# Patient Record
Sex: MALE | Race: WHITE | NOT HISPANIC OR LATINO | URBAN - METROPOLITAN AREA
[De-identification: names, ages, dates, MRNs, and addresses within clinical notes are randomized per-mention and may not be internally consistent; named-entity substitution may affect disease eponyms.]

---

## 2020-07-25 ENCOUNTER — TELEPHONE ENCOUNTER (OUTPATIENT)
Dept: URBAN - METROPOLITAN AREA CLINIC 13 | Facility: CLINIC | Age: 60
End: 2020-07-25

## 2020-07-25 RX ORDER — ESOMEPRAZOLE MAGNESIUM 40 MG/1
TAKE ONE CAPSULE BY MOUTH TWICE A DAY (FOR ACID REFLUX) CAPSULE, DELAYED RELEASE PELLETS ORAL
Qty: 60 | Refills: 5 | OUTPATIENT
Start: 2016-12-22 | End: 2018-12-26

## 2020-07-25 RX ORDER — DEXLANSOPRAZOLE 60 MG/1
TAKE 1 CAPSULE BY MOUTH DAILY CAPSULE, DELAYED RELEASE ORAL
Qty: 30 | Refills: 4 | OUTPATIENT
Start: 2014-01-02 | End: 2014-01-09

## 2020-07-25 RX ORDER — MELOXICAM 15 MG/1
TAKE 1 TABLET DAILY TABLET ORAL
Refills: 0 | OUTPATIENT
Start: 2013-12-02 | End: 2014-05-21

## 2020-07-26 ENCOUNTER — TELEPHONE ENCOUNTER (OUTPATIENT)
Dept: URBAN - METROPOLITAN AREA CLINIC 13 | Facility: CLINIC | Age: 60
End: 2020-07-26

## 2020-07-26 RX ORDER — DOCUSATE SODIUM 100 MG/1
TAKE 1 CAPSULE TWICE DAILY AS NEEDED CAPSULE, LIQUID FILLED ORAL
Refills: 0 | Status: ACTIVE | COMMUNITY
Start: 2019-06-12

## 2020-07-26 RX ORDER — LEVOFLOXACIN 500 MG/1
TABLET, FILM COATED ORAL
Qty: 7 | Refills: 0 | Status: ACTIVE | COMMUNITY
Start: 2018-01-10

## 2020-07-26 RX ORDER — MELOXICAM 15 MG/1
TABLET ORAL
Qty: 30 | Refills: 0 | Status: ACTIVE | COMMUNITY
Start: 2013-09-09

## 2020-07-26 RX ORDER — CITALOPRAM 20 MG/1
TAKE 1 TABLET DAILY TABLET ORAL
Qty: 30 | Refills: 5 | Status: ACTIVE | COMMUNITY
Start: 2019-09-19

## 2020-07-26 RX ORDER — DULOXETINE 20 MG/1
TAKE 1 CAPSULE DAILY CAPSULE, DELAYED RELEASE PELLETS ORAL
Qty: 30 | Refills: 5 | Status: ACTIVE | COMMUNITY
Start: 2019-09-19

## 2020-07-26 RX ORDER — BROMPHENIRAMINE MALEATE, PSEUDOEPHEDRINE HYDROCHLORIDE, 2; 30; 10 MG/5ML; MG/5ML; MG/5ML
SYRUP ORAL
Qty: 300 | Refills: 0 | Status: ACTIVE | COMMUNITY
Start: 2018-01-10

## 2020-07-26 RX ORDER — DOXYCYCLINE 100 MG/1
CAPSULE ORAL
Qty: 2 | Refills: 0 | Status: ACTIVE | COMMUNITY
Start: 2013-02-28

## 2020-08-07 ENCOUNTER — TELEPHONE ENCOUNTER (OUTPATIENT)
Dept: URBAN - METROPOLITAN AREA CLINIC 113 | Facility: CLINIC | Age: 60
End: 2020-08-07

## 2020-08-19 ENCOUNTER — TELEPHONE ENCOUNTER (OUTPATIENT)
Dept: URBAN - METROPOLITAN AREA CLINIC 113 | Facility: CLINIC | Age: 60
End: 2020-08-19

## 2020-08-19 ENCOUNTER — OFFICE VISIT (OUTPATIENT)
Dept: URBAN - METROPOLITAN AREA CLINIC 113 | Facility: CLINIC | Age: 60
End: 2020-08-19
Payer: COMMERCIAL

## 2020-08-19 VITALS
TEMPERATURE: 98.4 F | SYSTOLIC BLOOD PRESSURE: 135 MMHG | HEIGHT: 74 IN | DIASTOLIC BLOOD PRESSURE: 86 MMHG | RESPIRATION RATE: 20 BRPM | BODY MASS INDEX: 20.53 KG/M2 | HEART RATE: 64 BPM | WEIGHT: 160 LBS

## 2020-08-19 DIAGNOSIS — Z86.010 HISTORY OF ADENOMATOUS POLYP OF COLON: ICD-10-CM

## 2020-08-19 DIAGNOSIS — K21.0 GASTROESOPHAGEAL REFLUX DISEASE WITH ESOPHAGITIS: ICD-10-CM

## 2020-08-19 DIAGNOSIS — K22.2 ESOPHAGEAL STRICTURE: ICD-10-CM

## 2020-08-19 PROBLEM — 429047008: Status: ACTIVE | Noted: 2020-08-19

## 2020-08-19 PROCEDURE — G9903 PT SCRN TBCO ID AS NON USER: HCPCS | Performed by: INTERNAL MEDICINE

## 2020-08-19 PROCEDURE — 99214 OFFICE O/P EST MOD 30 MIN: CPT | Performed by: INTERNAL MEDICINE

## 2020-08-19 PROCEDURE — 1036F TOBACCO NON-USER: CPT | Performed by: INTERNAL MEDICINE

## 2020-08-19 PROCEDURE — G8427 DOCREV CUR MEDS BY ELIG CLIN: HCPCS | Performed by: INTERNAL MEDICINE

## 2020-08-19 RX ORDER — BROMPHENIRAMINE MALEATE, PSEUDOEPHEDRINE HYDROCHLORIDE, 2; 30; 10 MG/5ML; MG/5ML; MG/5ML
SYRUP ORAL
Qty: 300 | Refills: 0 | Status: DISCONTINUED | COMMUNITY
Start: 2018-01-10

## 2020-08-19 RX ORDER — MELOXICAM 15 MG/1
TABLET ORAL
Qty: 30 | Refills: 0 | Status: DISCONTINUED | COMMUNITY
Start: 2013-09-09

## 2020-08-19 RX ORDER — DOXYCYCLINE 100 MG/1
CAPSULE ORAL
Qty: 2 | Refills: 0 | Status: ON HOLD | COMMUNITY
Start: 2013-02-28

## 2020-08-19 RX ORDER — SODIUM, POTASSIUM,MAG SULFATES 17.5-3.13G
354 ML SOLUTION, RECONSTITUTED, ORAL ORAL ONCE
Qty: 354 MILLILITER | Refills: 0 | OUTPATIENT

## 2020-08-19 RX ORDER — DOCUSATE SODIUM 100 MG/1
TAKE 1 CAPSULE TWICE DAILY AS NEEDED CAPSULE, LIQUID FILLED ORAL
Refills: 0 | Status: ACTIVE | COMMUNITY
Start: 2019-06-12

## 2020-08-19 RX ORDER — LEVOFLOXACIN 500 MG/1
TABLET, FILM COATED ORAL
Qty: 7 | Refills: 0 | Status: DISCONTINUED | COMMUNITY
Start: 2018-01-10

## 2020-08-19 RX ORDER — CITALOPRAM 20 MG/1
TAKE 1 TABLET DAILY TABLET ORAL
Qty: 30 | Refills: 5 | Status: ON HOLD | COMMUNITY
Start: 2019-09-19

## 2020-08-19 RX ORDER — ESOMEPRAZOLE MAGNESIUM 40 MG/1
1 CAPSULE CAPSULE, DELAYED RELEASE ORAL ONCE A DAY
Status: ACTIVE | COMMUNITY

## 2020-08-19 RX ORDER — DULOXETINE 20 MG/1
TAKE 1 CAPSULE DAILY CAPSULE, DELAYED RELEASE PELLETS ORAL
Qty: 30 | Refills: 5 | Status: ACTIVE | COMMUNITY
Start: 2019-09-19

## 2020-08-19 RX ORDER — ESOMEPRAZOLE MAGNESIUM 40 MG/1
1 CAPSULE CAPSULE, DELAYED RELEASE ORAL TWICE A DAY
Qty: 60 CAPSULE | Refills: 11

## 2020-08-19 NOTE — PHYSICAL EXAM EYES:
Conjuntivae and eyelids appear normal,  Sclerae : no icteris , Conjuntivae and eyelids appear normal,  Sclerae : no icteris

## 2020-08-19 NOTE — HPI-TODAY'S VISIT:
This is a 60-year-old male with a history of achalasia status post Heller myotomy, esophageal stricture, GERD with esophagitis, and adenomatous colon polyps due surveillance in November presenting for follow-up regarding difficulty swallowing.  He was last seen 9/19/2019.  His last EGD in December 2018 was notable for a 7 mm stenosis at the GE junction dilated with a TTS dilator to 10 mm, LA grade C esophagitis, and a large hiatal hernia.  He reported difficulty swallowing had improved.  He was compliant with Nexium 40 mg twice a day.  He was experiencing stress and joint pain.  He had lost 26 pounds since his last visit in December.  His case was discussed with his primary care physician, Dr. Fish.  There was some question of polymyalgia rheumatica for which labs have been ordered and not drawn due to patient concerns regarding finances.  He was prescribed duloxetine under the guidance of Dr. Fish.  He was to follow-up with Dr. Fish regarding his rheumatologic complaints.  He was to continue twice a day Nexium.    He is compliant with Nexium 40mg reporting he has been taking it once a day for the last year. He reports difficulty swallowing for the last 6 months describing solid food lodging in the distal esophagus relieved with regurgitation.  This is occuring frequently on a daily basis.  In addition, he has regurgitation at night associated with eating late. He reports mild intermittent nausea without vomiting. He is maintaining his weight with soup and liquids/shakes.

## 2020-08-19 NOTE — PHYSICAL EXAM CARDIOVASCULAR:
no edema, no murmurs, regular rate and rhythm , no edema, no murmurs, regular rate and rhythm , no edema, no murmurs, regular rate and rhythm , no edema, no murmurs, regular rate and rhythm

## 2020-08-19 NOTE — PHYSICAL EXAM HENT:
Head,  normocephalic,  atraumatic,  Face,  Face within normal limits,  Ears,  External ears within normal limits,  Nose/Nasopharynx,  External nose  normal appearance Lips,  Appearance normal , Head,  normocephalic,  atraumatic,  Face,  Face within normal limits,  Ears,  External ears within normal limits,  Nose/Nasopharynx,  External nose  normal appearance Lips,  Appearance normal

## 2020-08-19 NOTE — PHYSICAL EXAM GASTROINTESTINAL
Abdomen , soft, nontender, nondistended , no guarding or rigidity , no masses palpable , normal bowel sounds , Liver and Spleen , no hepatosplenomegaly , Abdomen , soft, nontender, nondistended , no guarding or rigidity , no masses palpable , normal bowel sounds , Liver and Spleen , no hepatosplenomegaly

## 2020-08-25 ENCOUNTER — OFFICE VISIT (OUTPATIENT)
Dept: URBAN - METROPOLITAN AREA MEDICAL CENTER 2 | Facility: MEDICAL CENTER | Age: 60
End: 2020-08-25
Payer: COMMERCIAL

## 2020-08-25 DIAGNOSIS — K21.0 BILE REFLUX ESOPHAGITIS: ICD-10-CM

## 2020-08-25 DIAGNOSIS — K22.2 ACQUIRED ESOPHAGEAL RING: ICD-10-CM

## 2020-08-25 DIAGNOSIS — Z86.010 H/O ADENOMATOUS POLYP OF COLON: ICD-10-CM

## 2020-08-25 DIAGNOSIS — K57.30 ACQUIRED DIVERTICULOSIS OF COLON: ICD-10-CM

## 2020-08-25 DIAGNOSIS — R13.10 ABNORMAL SWALLOWING: ICD-10-CM

## 2020-08-25 DIAGNOSIS — K44.9 DIAPHRAGMATIC HERNIA: ICD-10-CM

## 2020-08-25 PROCEDURE — G0105 COLORECTAL SCRN; HI RISK IND: HCPCS | Performed by: INTERNAL MEDICINE

## 2020-08-25 PROCEDURE — 43249 ESOPH EGD DILATION <30 MM: CPT | Performed by: INTERNAL MEDICINE

## 2020-09-24 ENCOUNTER — ERX REFILL RESPONSE (OUTPATIENT)
Age: 60
End: 2020-09-24

## 2020-09-24 RX ORDER — DULOXETINE 20 MG/1
TAKE 1 CAPSULE DAILY CAPSULE, DELAYED RELEASE ORAL
Qty: 30 | Refills: 4

## 2020-11-19 ENCOUNTER — OFFICE VISIT (OUTPATIENT)
Dept: URBAN - METROPOLITAN AREA CLINIC 113 | Facility: CLINIC | Age: 60
End: 2020-11-19

## 2021-05-20 ENCOUNTER — WEB ENCOUNTER (OUTPATIENT)
Dept: URBAN - METROPOLITAN AREA CLINIC 113 | Facility: CLINIC | Age: 61
End: 2021-05-20

## 2021-05-20 ENCOUNTER — OFFICE VISIT (OUTPATIENT)
Dept: URBAN - METROPOLITAN AREA CLINIC 113 | Facility: CLINIC | Age: 61
End: 2021-05-20
Payer: COMMERCIAL

## 2021-05-20 VITALS
DIASTOLIC BLOOD PRESSURE: 94 MMHG | RESPIRATION RATE: 18 BRPM | HEIGHT: 74 IN | WEIGHT: 170 LBS | TEMPERATURE: 97.1 F | SYSTOLIC BLOOD PRESSURE: 177 MMHG | HEART RATE: 61 BPM | BODY MASS INDEX: 21.82 KG/M2

## 2021-05-20 DIAGNOSIS — D50.0 ANEMIA DUE TO CHRONIC BLOOD LOSS: ICD-10-CM

## 2021-05-20 DIAGNOSIS — R19.5 FECAL OCCULT BLOOD TEST POSITIVE: ICD-10-CM

## 2021-05-20 DIAGNOSIS — K21.00 GASTROESOPHAGEAL REFLUX DISEASE WITH ESOPHAGITIS WITHOUT HEMORRHAGE: ICD-10-CM

## 2021-05-20 DIAGNOSIS — R13.10 ESOPHAGEAL DYSPHAGIA: ICD-10-CM

## 2021-05-20 DIAGNOSIS — K22.2 ESOPHAGEAL STRICTURE: ICD-10-CM

## 2021-05-20 PROBLEM — 266433003: Status: RESOLVED | Noted: 2020-08-19 | Resolved: 2021-05-20

## 2021-05-20 PROCEDURE — 99214 OFFICE O/P EST MOD 30 MIN: CPT | Performed by: INTERNAL MEDICINE

## 2021-05-20 RX ORDER — ASPIRIN 325 MG/1
1 TABLET TABLET, FILM COATED ORAL
Status: ACTIVE | COMMUNITY

## 2021-05-20 RX ORDER — DOXYCYCLINE 100 MG/1
CAPSULE ORAL
Qty: 2 | Refills: 0 | Status: DISCONTINUED | COMMUNITY
Start: 2013-02-28

## 2021-05-20 RX ORDER — MELOXICAM 7.5 MG/1
1 TABLET TABLET ORAL ONCE A DAY
Status: ACTIVE | COMMUNITY

## 2021-05-20 RX ORDER — SODIUM, POTASSIUM,MAG SULFATES 17.5-3.13G
354 ML SOLUTION, RECONSTITUTED, ORAL ORAL ONCE
Qty: 354 MILLILITER | Refills: 0 | Status: DISCONTINUED | COMMUNITY

## 2021-05-20 RX ORDER — DULOXETINE 20 MG/1
TAKE 1 CAPSULE DAILY CAPSULE, DELAYED RELEASE ORAL
Qty: 30 | Refills: 4 | Status: ACTIVE | COMMUNITY

## 2021-05-20 RX ORDER — ESOMEPRAZOLE MAGNESIUM 40 MG/1
1 CAPSULE CAPSULE, DELAYED RELEASE ORAL TWICE A DAY
Qty: 60 CAPSULE | Refills: 11 | Status: ACTIVE | COMMUNITY

## 2021-05-20 RX ORDER — CITALOPRAM 20 MG/1
TAKE 1 TABLET DAILY TABLET ORAL
Qty: 30 | Refills: 5 | Status: DISCONTINUED | COMMUNITY
Start: 2019-09-19

## 2021-05-20 RX ORDER — DOCUSATE SODIUM 100 MG/1
TAKE 1 CAPSULE TWICE DAILY AS NEEDED CAPSULE, LIQUID FILLED ORAL
Refills: 0 | Status: DISCONTINUED | COMMUNITY
Start: 2019-06-12

## 2021-05-20 NOTE — HPI-OTHER HISTORIES
Labs 3/16/2021 stool for occult blood positive 3 out of 3 specimens.  It was discussed that this was likely associated from reflux esophagitis.  EGD 8/25/2020:LA grade D esophagitis, a benign appearing intrinsic severe stenosis of the GE junction dilated sequentially with a balloon dilator to 10 mm, large hiatal hernia, normal stomach, normal examined duodenum.   Colonoscopy 8/25/2020: BBPS 7, normal rectal exam, sigmoid and descending diverticulosis, normal terminal ileum  and no abnormality due to a prior history of adenomas, surveillance is recommended in.  2025.

## 2021-05-20 NOTE — HPI-TODAY'S VISIT:
This is a 60-year-old male with a history of achalasia status post Heller myotomy, esophageal stricture, GERD with esophagitis, and adenomatous colon polyps presenting for evaluation of blood in the stool.   He was last seen for visit 8/19/2020.  He was taking Nexium 40 mg daily.  He reported a 6-month history of difficulty swallowing describing esophageal dysphagia relieved with regurgitation.  He also had regurgitation at night associated with eating late.  He reported mild intermittent nausea.  He was maintaining his weight with soup and liquids.  He was instructed to increase Nexium to twice a day and scheduled for an EGD and surveillance colonoscopy.  He states labs were performed 3 to 4 months ago with his primary care physician demonstrating anemia.  He was instructed to begin iron.  He is compliant with iron therapy most days but admits to skipping doses because it makes him feel ill.  She denies overt signs of GI blood loss.  He was referred here when stool for Hemoccult returned positive.  He reports stable difficulty swallowing controlled by eating slowly.  His symptoms improved after his last dilation.  He has been compliant with Nexium twice a day.  He denies any other abdominal symptoms.  He has been prescribed meloxicam 7.5 mg daily and is also taking 2 full-strength aspirin per day to control pain. He reports a history of nausea associated with taking Tylenol. He is not taking antihypertensives.  His PCP is monitoring his BP for now.

## 2021-05-21 LAB
BASO (ABSOLUTE): 0.1
BASOS: 1
EOS (ABSOLUTE): 0.2
EOS: 4
FERRITIN, SERUM: 21
HEMATOCRIT: 42.9
HEMATOLOGY COMMENTS:: (no result)
HEMOGLOBIN: 13.3
IMMATURE CELLS: (no result)
IMMATURE GRANS (ABS): 0
IMMATURE GRANULOCYTES: 0
IRON BIND.CAP.(TIBC): 383
IRON SATURATION: 27
IRON: 104
LYMPHS (ABSOLUTE): 1.7
LYMPHS: 41
MCH: 25.2
MCHC: 31
MCV: 81
MONOCYTES(ABSOLUTE): 0.4
MONOCYTES: 10
NEUTROPHILS (ABSOLUTE): 1.8
NEUTROPHILS: 44
NRBC: (no result)
PLATELETS: 207
RBC: 5.27
RDW: 17
UIBC: 279
WBC: 4.2

## 2021-05-23 ENCOUNTER — TELEPHONE ENCOUNTER (OUTPATIENT)
Dept: URBAN - METROPOLITAN AREA CLINIC 113 | Facility: CLINIC | Age: 61
End: 2021-05-23

## 2021-05-23 PROBLEM — 266433003: Status: ACTIVE | Noted: 2021-05-20

## 2021-05-23 PROBLEM — 413533008: Status: ACTIVE | Noted: 2021-05-20

## 2021-05-23 PROBLEM — 59614000: Status: ACTIVE | Noted: 2021-05-20

## 2021-05-23 PROBLEM — 40890009: Status: ACTIVE | Noted: 2020-08-19

## 2021-05-23 PROBLEM — 63305008: Status: ACTIVE | Noted: 2020-08-19

## 2021-06-26 ENCOUNTER — TELEPHONE ENCOUNTER (OUTPATIENT)
Dept: URBAN - METROPOLITAN AREA CLINIC 113 | Facility: CLINIC | Age: 61
End: 2021-06-26

## 2021-09-07 ENCOUNTER — OFFICE VISIT (OUTPATIENT)
Dept: URBAN - METROPOLITAN AREA CLINIC 113 | Facility: CLINIC | Age: 61
End: 2021-09-07
Payer: COMMERCIAL

## 2021-09-07 ENCOUNTER — DASHBOARD ENCOUNTERS (OUTPATIENT)
Age: 61
End: 2021-09-07

## 2021-09-07 VITALS
WEIGHT: 167 LBS | HEIGHT: 74 IN | BODY MASS INDEX: 21.43 KG/M2 | SYSTOLIC BLOOD PRESSURE: 140 MMHG | HEART RATE: 68 BPM | TEMPERATURE: 97.5 F | DIASTOLIC BLOOD PRESSURE: 103 MMHG | RESPIRATION RATE: 20 BRPM

## 2021-09-07 DIAGNOSIS — R13.10 ESOPHAGEAL DYSPHAGIA: ICD-10-CM

## 2021-09-07 DIAGNOSIS — R19.5 FECAL OCCULT BLOOD TEST POSITIVE: ICD-10-CM

## 2021-09-07 DIAGNOSIS — K22.2 ESOPHAGEAL STRICTURE: ICD-10-CM

## 2021-09-07 DIAGNOSIS — K21.00 GASTROESOPHAGEAL REFLUX DISEASE WITH ESOPHAGITIS WITHOUT HEMORRHAGE: ICD-10-CM

## 2021-09-07 DIAGNOSIS — D50.0 ANEMIA DUE TO CHRONIC BLOOD LOSS: ICD-10-CM

## 2021-09-07 DIAGNOSIS — Z86.010 HISTORY OF ADENOMATOUS POLYP OF COLON: ICD-10-CM

## 2021-09-07 PROCEDURE — 99213 OFFICE O/P EST LOW 20 MIN: CPT | Performed by: INTERNAL MEDICINE

## 2021-09-07 RX ORDER — DULOXETINE 20 MG/1
TAKE 1 CAPSULE DAILY CAPSULE, DELAYED RELEASE ORAL
Qty: 30 | Refills: 4 | Status: ACTIVE | COMMUNITY

## 2021-09-07 RX ORDER — ASPIRIN 325 MG/1
1 TABLET TABLET, FILM COATED ORAL
Status: ACTIVE | COMMUNITY

## 2021-09-07 RX ORDER — MELOXICAM 7.5 MG/1
1 TABLET TABLET ORAL ONCE A DAY
Status: ACTIVE | COMMUNITY

## 2021-09-07 RX ORDER — ESOMEPRAZOLE MAGNESIUM 40 MG/1
1 CAPSULE CAPSULE, DELAYED RELEASE ORAL TWICE A DAY
Qty: 60 CAPSULE | Refills: 11 | Status: ACTIVE | COMMUNITY

## 2021-09-07 NOTE — HPI-TODAY'S VISIT:
This is a 61-year-old male with a history of achalasia status post Heller myotomy, esophageal stricture, GERD with esophagitis, and adenomatous colon polyps presenting for evaluation of blood in the stool.  He was last seen here on May 20, 2021 by Nirali Leal. If that time, he stated that labs had been performed 3 or 4 months earlier by his PCP demonstrating anemia.  He was instructed to begin iron, and was generally compliant with this regimen, although he skipped some doses because it made him feel ill.  He had a positive stool Hemoccult and was referred back to this office.  He reports stable difficulty swallowing controlled by eating slowly.  His symptoms improved after his last dilation.  He has been compliant with Nexium twice a day.  He denies any other abdominal symptoms.  He has been prescribed meloxicam 7.5 mg daily and is also taking 2 full-strength aspirin per day to control pain.  He reports a history of nausea associated with taking Tylenol.   After his last visit, he was scheduled for a small bowel series was performed on 6/24/21.  This was negative. Labs were also ordered, including a CBC and iron studies, but I cannot find these in the chart.  He was previously seen on 8/19/2020.  He was taking Nexium 40 mg daily.  He reported a 6-month history of difficulty swallowing describing esophageal dysphagia relieved with regurgitation.  He also had regurgitation at night associated with eating late.  He reported mild intermittent nausea.  He was maintaining his weight with soup and liquids.  He was instructed to increase Nexium to twice a day and scheduled for an EGD and surveillance colonoscopy. Labs 3/16/2021 stool for occult blood positive 3 out of 3 specimens.  It was discussed that this was likely associated from reflux esophagitis.  EGD 8/25/2020:LA grade D esophagitis, a benign appearing intrinsic severe stenosis of the GE junction dilated sequentially with a balloon dilator to 10 mm, large hiatal hernia, normal stomach, normal examined duodenum.    Colonoscopy 8/25/2020: BBPS 7, normal rectal exam, sigmoid and descending diverticulosis, normal terminal ileum  and no abnormality due to a prior history of adenomas, surveillance is recommended in.  2025.   The patient returns today doing well.  He's had some minimal dysphagia to solids, but does not feel he needs to have an upper endoscopy yet.  He is taking Nexium 40 mg by mouth twice a day to control his reflux.  He tells visit his hemoglobin is back up to normal.  Overall, he is doing "not bad."

## 2023-08-09 NOTE — PHYSICAL EXAM CARDIOVASCULAR:
Pt's  was instructed to call clinic and ask for neuro referral;    If approved, please fax neuro referral to:  [facility]   Fax #[phone]    Phone:[phone]        no edema,  no murmurs,  regular rate and rhythm

## 2024-07-19 ENCOUNTER — OFFICE VISIT (OUTPATIENT)
Dept: URBAN - METROPOLITAN AREA CLINIC 113 | Facility: CLINIC | Age: 64
End: 2024-07-19
Payer: MEDICARE

## 2024-07-19 ENCOUNTER — LAB OUTSIDE AN ENCOUNTER (OUTPATIENT)
Dept: URBAN - METROPOLITAN AREA CLINIC 113 | Facility: CLINIC | Age: 64
End: 2024-07-19

## 2024-07-19 VITALS
RESPIRATION RATE: 20 BRPM | HEART RATE: 64 BPM | WEIGHT: 177 LBS | DIASTOLIC BLOOD PRESSURE: 95 MMHG | HEIGHT: 74 IN | SYSTOLIC BLOOD PRESSURE: 150 MMHG | BODY MASS INDEX: 22.72 KG/M2 | TEMPERATURE: 98.2 F

## 2024-07-19 DIAGNOSIS — K21.00 GASTROESOPHAGEAL REFLUX DISEASE WITH ESOPHAGITIS WITHOUT HEMORRHAGE: ICD-10-CM

## 2024-07-19 DIAGNOSIS — Z86.010 HISTORY OF ADENOMATOUS POLYP OF COLON: ICD-10-CM

## 2024-07-19 DIAGNOSIS — R13.19 CERVICAL DYSPHAGIA: ICD-10-CM

## 2024-07-19 PROCEDURE — 99214 OFFICE O/P EST MOD 30 MIN: CPT | Performed by: NURSE PRACTITIONER

## 2024-07-19 RX ORDER — DULOXETINE 20 MG/1
TAKE 1 CAPSULE DAILY CAPSULE, DELAYED RELEASE ORAL
Qty: 30 | Refills: 4 | Status: ON HOLD | COMMUNITY

## 2024-07-19 RX ORDER — ASPIRIN 325 MG/1
1 TABLET TABLET, FILM COATED ORAL
Status: ON HOLD | COMMUNITY

## 2024-07-19 RX ORDER — ESOMEPRAZOLE MAGNESIUM 40 MG/1
1 CAPSULE CAPSULE, DELAYED RELEASE PELLETS ORAL ONCE A DAY
Status: ACTIVE | COMMUNITY

## 2024-07-19 RX ORDER — MELOXICAM 7.5 MG/1
1 TABLET TABLET ORAL ONCE A DAY
Status: ON HOLD | COMMUNITY

## 2024-07-19 RX ORDER — ESOMEPRAZOLE MAGNESIUM 40 MG/1
1 CAPSULE CAPSULE, DELAYED RELEASE ORAL TWICE A DAY
Qty: 60 CAPSULE | Refills: 11 | Status: ON HOLD | COMMUNITY

## 2024-07-19 NOTE — HPI-TODAY'S VISIT:
This is a 64-year-old male with a history of achalasia status post Heller myotomy, esophageal stricture, GERD with esophagitis, and adenomatous colon polyps presenting for evaluation of blood in the stool.    He was last seen September 2021 s/p negative small bowel series was performed. He was doing well.  He had minimal dysphagia to solids, but did not feel he needed to have an upper endoscopy.  He was taking Nexium 40 mg by mouth twice a day to control his reflux.  He reported a normal hemoglobin  He purchases his medication and, to reduce cost, is taking Esomeprazole 40mg daily.  He reports mild nocturnal heartburn 2-3 times per week.  For the last year he has experienced symptoms consistent with esophageal dysphagia relieved with regurgitation daily; typically multiple times per day.  Occasionally, relaxing and time will relieve.  He denies any other abdominal symptoms.  He denies any changes in medical history.

## 2024-07-23 ENCOUNTER — CLAIMS CREATED FROM THE CLAIM WINDOW (OUTPATIENT)
Dept: URBAN - METROPOLITAN AREA SURGERY CENTER 25 | Facility: SURGERY CENTER | Age: 64
End: 2024-07-23
Payer: MEDICARE

## 2024-07-23 DIAGNOSIS — K22.2 ESOPHAGEAL STENOSIS: ICD-10-CM

## 2024-07-23 DIAGNOSIS — R13.10 DYSPHAGIA: ICD-10-CM

## 2024-07-23 DIAGNOSIS — R13.19 DYSPHAGIA: ICD-10-CM

## 2024-07-23 DIAGNOSIS — R13.10 DYSPHAGIA, UNSPECIFIED: ICD-10-CM

## 2024-07-23 DIAGNOSIS — K44.9 HIATAL HERNIA: ICD-10-CM

## 2024-07-23 DIAGNOSIS — K22.2 ESOPHAGEAL OBSTRUCTION: ICD-10-CM

## 2024-07-23 DIAGNOSIS — K44.9 LARGE HIATAL HERNIA: ICD-10-CM

## 2024-07-23 PROCEDURE — 00731 ANES UPR GI NDSC PX NOS: CPT | Performed by: ANESTHESIOLOGY

## 2024-07-23 PROCEDURE — 00731 ANES UPR GI NDSC PX NOS: CPT | Performed by: NURSE ANESTHETIST, CERTIFIED REGISTERED

## 2024-07-23 PROCEDURE — 43249 ESOPH EGD DILATION <30 MM: CPT | Performed by: CLINIC/CENTER

## 2024-07-23 PROCEDURE — 43249 ESOPH EGD DILATION <30 MM: CPT | Performed by: INTERNAL MEDICINE

## 2024-09-19 ENCOUNTER — OFFICE VISIT (OUTPATIENT)
Dept: URBAN - METROPOLITAN AREA CLINIC 107 | Facility: CLINIC | Age: 64
End: 2024-09-19
Payer: MEDICARE

## 2024-09-19 ENCOUNTER — LAB OUTSIDE AN ENCOUNTER (OUTPATIENT)
Dept: URBAN - METROPOLITAN AREA CLINIC 107 | Facility: CLINIC | Age: 64
End: 2024-09-19

## 2024-09-19 VITALS
HEIGHT: 74 IN | WEIGHT: 180.2 LBS | OXYGEN SATURATION: 98 % | RESPIRATION RATE: 20 BRPM | SYSTOLIC BLOOD PRESSURE: 141 MMHG | BODY MASS INDEX: 23.13 KG/M2 | HEART RATE: 63 BPM | TEMPERATURE: 97.7 F | DIASTOLIC BLOOD PRESSURE: 90 MMHG

## 2024-09-19 DIAGNOSIS — K22.2 ESOPHAGEAL STENOSIS: ICD-10-CM

## 2024-09-19 DIAGNOSIS — K44.9 LARGE HIATAL HERNIA: ICD-10-CM

## 2024-09-19 DIAGNOSIS — K21.00 GASTROESOPHAGEAL REFLUX DISEASE WITH ESOPHAGITIS WITHOUT HEMORRHAGE: ICD-10-CM

## 2024-09-19 DIAGNOSIS — R13.19 CERVICAL DYSPHAGIA: ICD-10-CM

## 2024-09-19 PROBLEM — 300286002: Status: ACTIVE | Noted: 2024-09-19

## 2024-09-19 PROCEDURE — 99214 OFFICE O/P EST MOD 30 MIN: CPT | Performed by: NURSE PRACTITIONER

## 2024-09-19 RX ORDER — ESOMEPRAZOLE MAGNESIUM 40 MG/1
1 CAPSULE CAPSULE, DELAYED RELEASE PELLETS ORAL ONCE A DAY
Status: ACTIVE | COMMUNITY

## 2024-09-19 RX ORDER — DULOXETINE 20 MG/1
TAKE 1 CAPSULE DAILY CAPSULE, DELAYED RELEASE ORAL
Qty: 30 | Refills: 4 | Status: ON HOLD | COMMUNITY

## 2024-09-19 RX ORDER — ESOMEPRAZOLE MAGNESIUM 40 MG/1
1 CAPSULE CAPSULE, DELAYED RELEASE ORAL TWICE A DAY
Qty: 60 CAPSULE | Refills: 11 | Status: ON HOLD | COMMUNITY

## 2024-09-19 RX ORDER — FAMOTIDINE 40 MG/1
1 TABLET AT BEDTIME TABLET, FILM COATED ORAL ONCE A DAY
Qty: 90 TABLET | Refills: 3 | OUTPATIENT
Start: 2024-09-19

## 2024-09-19 RX ORDER — ASPIRIN 325 MG/1
1 TABLET TABLET, FILM COATED ORAL
Status: ON HOLD | COMMUNITY

## 2024-09-19 RX ORDER — MELOXICAM 7.5 MG/1
1 TABLET TABLET ORAL ONCE A DAY
Status: ON HOLD | COMMUNITY

## 2024-09-19 NOTE — HPI-TODAY'S VISIT:
This is a 64-year-old male with a history of achalasia status post Heller myotomy, esophageal stricture, GERD with esophagitis, and adenomatous colon polyps presenting for EGD follow-up.    Seen September 2021 s/p negative small bowel series was performed. He was doing well.  He had minimal dysphagia to solids, but did not feel he needed to have an upper endoscopy.  He was taking Nexium 40 mg by mouth twice a day to control his reflux.  He reported a normal hemoglobin  Interval history, 7/19/2024 He purchases his medication and, to reduce cost, is taking Esomeprazole 40mg daily.  He reports mild nocturnal heartburn 2-3 times per week.  For the last year he has experienced symptoms consistent with esophageal dysphagia relieved with regurgitation daily; typically multiple times per day.  Occasionally, relaxing and time will relieve.  He denies any other abdominal symptoms. He denies any changes in medical history.    EGD with dilatation ordered esomeprazole was increased to 40 mg twice a day. Due for surveillance colonoscopy August 2025.  Interval history, 9/19/2024 EGD 7/23/2024 at home revealed benign-appearing esophageal stenosis measuring 8 mm which was dilated to 12 mm.  Large hiatal hernia.  EGD otherwise normal.  He reports dysphagia was good for a month and then started again with solid to lower esophagus about twice a day.  Lately at night he has been feeling reflux up to his neck area.  He does tend to eat late and sleeps flat on the bed.

## 2024-09-19 NOTE — PHYSICAL EXAM NEUROLOGIC:
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 Pt is seeking medication management for symptoms of anxiety and depression, including feelings of sadness, feeling restless, unable to sit still, problems with attention or concentration, difficulties sleeping, stress, and feeling angry or irritable. Pt states that he worries a lot and that this has been disrupting his sleep. Since COVID (which was a difficult time for pt due to his clinical work as a provider and constant exposure to COVID and patients dying), pt reports being more impulsive with spending money. Pt reports previous diagnosis with ADHD and trying stimulants in the past, but this being ineffective. pt reports difficulties concentrating since childhood, unable to sustain focus for more than 20 minutes, and needing to feel mentally stimulated to remain focused. 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 Pt reports that recent stressors including having viral meningitis in September with sequela, including chronic migraines. Pt also states that he holds a high stress full time job often working 12 hrs a day.  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  Pt states that he has to keep busy or falls into a funk.  Pt reports that he works his second job because he enjoys working clinically, and the monetary piece doesn't hurt. Pt reports saying inappropriate things at work when he was not in a "right state of mind due to bad case of meningitis." Pt emphasized history of impulsivity, mostly with spending money, e.g., randomly deciding to buy a house and a boat. "There is always some kind of regret later but nothing tremendous. There is thought that goes into it, and have made calculations, not like blowing money at a casino." When assessed for bipolar symptoms, pt reports that he is "mostly on the downer side, never an excessive energy thing." Denied elevated mood in conjunction with impulsivity.  [FreeTextEntry2] : Pt saw a psychologist as a child around 12-14 yo for "depression, related to difficult relationship with mom and acting out."\par  At 23 yo, in 2011, went through a bad break up and depression, and saw a psychiatrist (does not recall name ). "Feel like he was kind of a quack," because he prescribed a lot of different pills at once and doing adjunctive therapy. \par Saw an [facility] based psychiatrist in 2015-16 for about a year, weened down to just Cymbalta. Left because changed jobs and insurance no longer covers. Saw him for CBT and medication. (Does not recall name)\par Prescription for Cymbalta has gone from 20-40 mg and back down to 20 mg.\par Primary care doctor, Dr. 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 Meet once a week for one hour. 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 [FreeTextEntry3] : Previously prescribed stimulants around 2021-22, not effective. Switched to Strattera. "Pretty good, didn’t feel an amazing change, but have so much neurological stuff going on so left it on the side burner until i can get other stuff figured out."\par Takes Cymbalta (20 mg) since 5/1/23 prescribed by PCP Dr. Callum Bruce.\par Prescribed Klonopin (.5 mg) for anxiety, sometimes takes for sleep. Takes 1-2x per week.\par

## 2024-10-23 ENCOUNTER — OFFICE VISIT (OUTPATIENT)
Dept: URBAN - METROPOLITAN AREA SURGERY CENTER 25 | Facility: SURGERY CENTER | Age: 64
End: 2024-10-23

## 2024-11-05 ENCOUNTER — OFFICE VISIT (OUTPATIENT)
Dept: URBAN - METROPOLITAN AREA SURGERY CENTER 25 | Facility: SURGERY CENTER | Age: 64
End: 2024-11-05
Payer: MEDICARE

## 2024-11-05 DIAGNOSIS — K22.2 ESOPHAGEAL OBSTRUCTION: ICD-10-CM

## 2024-11-05 DIAGNOSIS — K44.9 HIATAL HERNIA: ICD-10-CM

## 2024-11-05 DIAGNOSIS — K22.2 ACQUIRED ESOPHAGEAL RING: ICD-10-CM

## 2024-11-05 DIAGNOSIS — R13.19 CERVICAL DYSPHAGIA: ICD-10-CM

## 2024-11-05 PROCEDURE — 43248 EGD GUIDE WIRE INSERTION: CPT | Performed by: INTERNAL MEDICINE

## 2024-11-05 PROCEDURE — 43249 ESOPH EGD DILATION <30 MM: CPT | Performed by: INTERNAL MEDICINE

## 2024-11-05 PROCEDURE — 43248 EGD GUIDE WIRE INSERTION: CPT | Performed by: CLINIC/CENTER

## 2024-11-05 PROCEDURE — 00731 ANES UPR GI NDSC PX NOS: CPT | Performed by: ANESTHESIOLOGY

## 2024-11-05 PROCEDURE — 43249 ESOPH EGD DILATION <30 MM: CPT | Performed by: CLINIC/CENTER

## 2024-11-06 ENCOUNTER — OFFICE VISIT (OUTPATIENT)
Dept: URBAN - METROPOLITAN AREA CLINIC 107 | Facility: CLINIC | Age: 64
End: 2024-11-06

## 2024-11-19 ENCOUNTER — OFFICE VISIT (OUTPATIENT)
Dept: URBAN - METROPOLITAN AREA CLINIC 107 | Facility: CLINIC | Age: 64
End: 2024-11-19